# Patient Record
Sex: FEMALE | Race: WHITE | NOT HISPANIC OR LATINO | Employment: STUDENT | URBAN - METROPOLITAN AREA
[De-identification: names, ages, dates, MRNs, and addresses within clinical notes are randomized per-mention and may not be internally consistent; named-entity substitution may affect disease eponyms.]

---
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
## 2020-08-07 ENCOUNTER — TELEPHONE (OUTPATIENT)
Dept: OBGYN CLINIC | Facility: MEDICAL CENTER | Age: 20
End: 2020-08-07

## 2020-08-07 NOTE — TELEPHONE ENCOUNTER
----- Message from Anastacio Perry sent at 8/7/2020 10:20 AM EDT -----  Regarding: RE: needs prior auth  I contacted pt's insurance  Effective 1/1/20 and active  Pt is covered for removal at 100%  No PA needed  Shaunna Fleeting Ref# [id number]   ----- Message -----  From: Kushal Flower MA  Sent: 8/6/2020   1:53 PM EDT  To: Anastacio Perry  Subject: needs prior auth                                 New pt needs prior auth for mirena removal appt scheduled for 09/18/2020

## 2021-03-13 ENCOUNTER — OFFICE VISIT (OUTPATIENT)
Dept: URGENT CARE | Facility: CLINIC | Age: 21
End: 2021-03-13
Payer: COMMERCIAL

## 2021-03-13 VITALS
HEART RATE: 112 BPM | HEIGHT: 65 IN | TEMPERATURE: 98 F | BODY MASS INDEX: 42.82 KG/M2 | RESPIRATION RATE: 20 BRPM | WEIGHT: 257 LBS | DIASTOLIC BLOOD PRESSURE: 80 MMHG | OXYGEN SATURATION: 97 % | SYSTOLIC BLOOD PRESSURE: 142 MMHG

## 2021-03-13 DIAGNOSIS — R39.89 SENSATION OF PRESSURE IN BLADDER AREA: Primary | ICD-10-CM

## 2021-03-13 DIAGNOSIS — O09.93 HIGH-RISK PREGNANCY IN THIRD TRIMESTER: ICD-10-CM

## 2021-03-13 PROBLEM — O09.299 HX OF PREECLAMPSIA, PRIOR PREGNANCY, CURRENTLY PREGNANT: Status: ACTIVE | Noted: 2020-10-15

## 2021-03-13 PROBLEM — O34.219 PREVIOUS CESAREAN DELIVERY, ANTEPARTUM: Status: ACTIVE | Noted: 2020-10-15

## 2021-03-13 PROBLEM — O09.90 HRP (HIGH RISK PREGNANCY): Status: ACTIVE | Noted: 2020-10-15

## 2021-03-13 LAB
SL AMB  POCT GLUCOSE, UA: ABNORMAL
SL AMB LEUKOCYTE ESTERASE,UA: ABNORMAL
SL AMB POCT BILIRUBIN,UA: ABNORMAL
SL AMB POCT BLOOD,UA: ABNORMAL
SL AMB POCT CLARITY,UA: CLEAR
SL AMB POCT COLOR,UA: YELLOW
SL AMB POCT KETONES,UA: ABNORMAL
SL AMB POCT NITRITE,UA: ABNORMAL
SL AMB POCT PH,UA: 6.5
SL AMB POCT SPECIFIC GRAVITY,UA: 1.01
SL AMB POCT URINE PROTEIN: ABNORMAL
SL AMB POCT UROBILINOGEN: 0.2

## 2021-03-13 PROCEDURE — 81002 URINALYSIS NONAUTO W/O SCOPE: CPT | Performed by: NURSE PRACTITIONER

## 2021-03-13 PROCEDURE — 87086 URINE CULTURE/COLONY COUNT: CPT | Performed by: NURSE PRACTITIONER

## 2021-03-13 PROCEDURE — 99213 OFFICE O/P EST LOW 20 MIN: CPT | Performed by: NURSE PRACTITIONER

## 2021-03-13 RX ORDER — ASPIRIN 81 MG/1
162 TABLET, CHEWABLE ORAL DAILY
COMMUNITY
Start: 2020-11-13

## 2021-03-13 RX ORDER — CEPHALEXIN 500 MG/1
500 CAPSULE ORAL 2 TIMES DAILY
Qty: 14 CAPSULE | Refills: 0 | Status: SHIPPED | OUTPATIENT
Start: 2021-03-13 | End: 2021-03-20

## 2021-03-13 RX ORDER — PNV NO.95/FERROUS FUM/FOLIC AC 28MG-0.8MG
1 TABLET ORAL DAILY
COMMUNITY

## 2021-03-13 NOTE — PATIENT INSTRUCTIONS
Let your on call OBGYN know about your symptoms  Your urine dip was normal except for trace white blood cells (leukocytes)  I did send an antibiotic (keflex) over to the pharmacy to treat UTI  Urinary Tract Infection in Pregnancy   AMBULATORY CARE:   A urinary tract infection (UTI)  is caused by bacteria that get inside your urinary tract  The urinary tract includes your kidneys and bladder  UTIs are common during pregnancy  This is because of changes in your immune system, hormones, and uterus  As your uterus grows, your bladder may not completely empty  Bacteria can grow in the urine left in your bladder and cause a UTI  UTIs during pregnancy can increase your risk for a kidney infection and  labor  Commons signs and symptoms of a UTI:   · Urinating more often, leaking urine, or waking from sleep to urinate    · Pain or burning when you urinate    · Pain or pressure in your lower abdomen    · Urine that smells bad    · Blood in your urine    Seek care immediately if:   · You are urinating very little or not at all  · You have severe pain  · You have a fever and chills  Call your doctor or obstetrician if:   · You have pain in the sides of your back  · You do not feel better after 2 days of treatment  · You are vomiting  · You have questions or concerns about your condition or care  Treatment:  Antibiotics may be given to kill bacteria that are causing your infection  Medicines may be given to decrease pain and burning when you urinate, or decrease the need to urinate often  These medicines will make your urine orange or red  Prevent a UTI:   · Urinate when you feel the urge  Do not hold your urine  Urinate as soon as needed  Always urinate after you have sex  This helps flush out bacteria passed during sex  · Drink liquids as directed  Ask how much liquid to drink each day and which liquids are best for you   You may need to drink more fluids than usual to help flush bacteria out of your urinary tract  Do not drink caffeine or carbonated liquids  These drinks can irritate your bladder  Your healthcare provider may recommend cranberry juice to help prevent a UTI  · Wipe from front to back after you urinate or have a bowel movement  This will help prevent germs from getting into your urinary tract through your urethra  · Do pelvic muscle exercises often  Pelvic muscle exercises may help you start and stop urinating  Strong pelvic muscles may help you empty your bladder easier  Squeeze these muscles tightly for 5 seconds like you are trying to hold back urine  Then relax for 5 seconds  Gradually work up to squeezing for 10 seconds  Do 3 sets of 15 repetitions a day, or as directed  Follow up with your doctor or obstetrician as directed: You may need to return for more urine tests  Write down your questions so you remember to ask them during your visits  © Copyright [address] Information is for End User's use only and may not be sold, redistributed or otherwise used for commercial purposes  All illustrations and images included in CareNotes® are the copyrighted property of [employer]  or [facility] [address] Ace[address]   The above information is an  only  It is not intended as medical advice for individual conditions or treatments  Talk to your doctor, nurse or pharmacist before following any medical regimen to see if it is safe and effective for you

## 2021-03-13 NOTE — PROGRESS NOTES
[facility]        NAME: Bel Alonso is a 21 y o  female  : 2000    MRN: [id number]  DATE: 2021  TIME: 11:57 AM      Assessment and Plan     Sensation of pressure in bladder area [R39 89]  1  Sensation of pressure in bladder area  POCT urine dip    Urine culture    cephalexin (KEFLEX) 500 mg capsule   2  High-risk pregnancy in third trimester  POCT urine dip    Urine culture    cephalexin (KEFLEX) 500 mg capsule         Patient Instructions     Patient Instructions     Let your on call OBGYN know about your symptoms  Your urine dip was normal except for trace white blood cells (leukocytes)  I did send an antibiotic (keflex) over to the pharmacy to treat UTI  Urinary Tract Infection in Pregnancy   AMBULATORY CARE:   A urinary tract infection (UTI)  is caused by bacteria that get inside your urinary tract  The urinary tract includes your kidneys and bladder  UTIs are common during pregnancy  This is because of changes in your immune system, hormones, and uterus  As your uterus grows, your bladder may not completely empty  Bacteria can grow in the urine left in your bladder and cause a UTI  UTIs during pregnancy can increase your risk for a kidney infection and  labor  Commons signs and symptoms of a UTI:   · Urinating more often, leaking urine, or waking from sleep to urinate    · Pain or burning when you urinate    · Pain or pressure in your lower abdomen    · Urine that smells bad    · Blood in your urine    Seek care immediately if:   · You are urinating very little or not at all  · You have severe pain  · You have a fever and chills  Call your doctor or obstetrician if:   · You have pain in the sides of your back  · You do not feel better after 2 days of treatment  · You are vomiting  · You have questions or concerns about your condition or care  Treatment:  Antibiotics may be given to kill bacteria that are causing your infection   Medicines may be given to decrease pain and burning when you urinate, or decrease the need to urinate often  These medicines will make your urine orange or red  Prevent a UTI:   · Urinate when you feel the urge  Do not hold your urine  Urinate as soon as needed  Always urinate after you have sex  This helps flush out bacteria passed during sex  · Drink liquids as directed  Ask how much liquid to drink each day and which liquids are best for you  You may need to drink more fluids than usual to help flush bacteria out of your urinary tract  Do not drink caffeine or carbonated liquids  These drinks can irritate your bladder  Your healthcare provider may recommend cranberry juice to help prevent a UTI  · Wipe from front to back after you urinate or have a bowel movement  This will help prevent germs from getting into your urinary tract through your urethra  · Do pelvic muscle exercises often  Pelvic muscle exercises may help you start and stop urinating  Strong pelvic muscles may help you empty your bladder easier  Squeeze these muscles tightly for 5 seconds like you are trying to hold back urine  Then relax for 5 seconds  Gradually work up to squeezing for 10 seconds  Do 3 sets of 15 repetitions a day, or as directed  Follow up with your doctor or obstetrician as directed: You may need to return for more urine tests  Write down your questions so you remember to ask them during your visits  © Copyright [address] Information is for End User's use only and may not be sold, redistributed or otherwise used for commercial purposes  All illustrations and images included in CareNotes® are the copyrighted property of [employer]  or [facility] [place] Acevedo   The above information is an  only  It is not intended as medical advice for individual conditions or treatments  Talk to your doctor, nurse or pharmacist before following any medical regimen to see if it is safe and effective for you          Follow up with PCP in 3-5 days  Proceed to  ER if symptoms worsen  Chief Complaint     Chief Complaint   Patient presents with    Possible UTI     pressure in perineum since last night, denies burning or frequency         History of Present Illness     Patient presents expressing concern about possible UTI  She is 29 weeks pregnant with her 2nd pregnancy  She states yesterday and today she has begun experiencing pressure in her bladder  She initially states perineum, but after we clarified anatomical locations,  She states that now, the pressure is in her bladder  It is worse after she voids and also with positional changes  She denies any abdominal tightening or contractions  She notes that baby is active  She states that this is how it felt when she had UTI in her son's pregnancy  Review of Systems     Review of Systems   Constitutional: Negative for chills and fever  Gastrointestinal:        29 weeks pregnant   Genitourinary: Positive for pelvic pain  Negative for dysuria, flank pain, frequency, hematuria and urgency  All other systems reviewed and are negative  Current Medications       Current Outpatient Medications:     aspirin (Aspirin 81) 81 mg chewable tablet, Chew 162 mg daily, Disp: , Rfl:     Prenatal Vit-Fe Fumarate-FA (PRENATAL VITAMIN) 27-0 8 MG TABS, Take 1 tablet by mouth daily, Disp: , Rfl:     cephalexin (KEFLEX) 500 mg capsule, Take 1 capsule (500 mg total) by mouth 2 (two) times a day for 7 days, Disp: 14 capsule, Rfl: 0    Prenatal Vit-Fe Fumarate-FA (Prenatal Vitamin and Mineral) 28-0 8 MG TABS, Take 1 tablet by mouth daily, Disp: , Rfl:     Current Allergies     Allergies as of 03/13/2021    (No Known Allergies)              The following portions of the patient's history were reviewed and updated as appropriate: allergies, current medications, past family history, past medical history, past social history, past surgical history and problem list      History reviewed  No pertinent past medical history  History reviewed  No pertinent surgical history  Family History   Problem Relation Age of Onset    No Known Problems Mother     Diabetes Maternal Grandmother     Breast cancer Maternal Grandmother     Other Paternal Grandmother         cardiac disorder         Medications have been verified  Objective     /80   Pulse (!) 112   Temp 98 °F (36 7 °C) (Temporal)   Resp 20   Ht 5' 5" (1 651 m)   Wt 117 kg (257 lb)   LMP  (Approximate)   SpO2 97%   BMI 42 77 kg/m²   No LMP recorded (approximate)  Patient is pregnant  Physical Exam     Physical Exam  Vitals signs and nursing note reviewed  Constitutional:       General: She is not in acute distress  Appearance: Normal appearance  She is well-developed  She is not toxic-appearing or diaphoretic  Pulmonary:      Effort: Pulmonary effort is normal  No respiratory distress  Abdominal:      Tenderness: There is abdominal tenderness in the suprapubic area  Comments: 29 weeks pregnant   Skin:     General: Skin is warm and dry  Capillary Refill: Capillary refill takes less than 2 seconds  Neurological:      General: No focal deficit present  Mental Status: She is alert and oriented to person, place, and time  Psychiatric:         Mood and Affect: Mood normal          Behavior: Behavior normal  Behavior is cooperative  Thought Content:  Thought content normal          Judgment: Judgment normal

## 2021-03-14 LAB — BACTERIA UR CULT: NORMAL
